# Patient Record
Sex: FEMALE
[De-identification: names, ages, dates, MRNs, and addresses within clinical notes are randomized per-mention and may not be internally consistent; named-entity substitution may affect disease eponyms.]

---

## 2023-12-28 PROBLEM — Z00.00 ENCOUNTER FOR PREVENTIVE HEALTH EXAMINATION: Status: ACTIVE | Noted: 2023-12-28

## 2024-02-02 ENCOUNTER — APPOINTMENT (OUTPATIENT)
Dept: UROGYNECOLOGY | Facility: CLINIC | Age: 19
End: 2024-02-02
Payer: COMMERCIAL

## 2024-02-02 VITALS
WEIGHT: 107 LBS | HEART RATE: 80 BPM | HEIGHT: 61 IN | BODY MASS INDEX: 20.2 KG/M2 | DIASTOLIC BLOOD PRESSURE: 59 MMHG | SYSTOLIC BLOOD PRESSURE: 92 MMHG

## 2024-02-02 DIAGNOSIS — Z87.19 PERSONAL HISTORY OF OTHER DISEASES OF THE DIGESTIVE SYSTEM: ICD-10-CM

## 2024-02-02 DIAGNOSIS — Z78.9 OTHER SPECIFIED HEALTH STATUS: ICD-10-CM

## 2024-02-02 DIAGNOSIS — M62.89 OTHER SPECIFIED DISORDERS OF MUSCLE: ICD-10-CM

## 2024-02-02 DIAGNOSIS — G90.A POSTURAL ORTHOSTATIC TACHYCARDIA SYNDROME [POTS]: ICD-10-CM

## 2024-02-02 NOTE — REASON FOR VISIT
[TextEntry] : Reason for visit: New  Voids per day: 8-10  Voids per night: 1 Urge incontinence: No  Stress incontinence: No Constipation: Yes, using laxatives every 3-4 days Fecal incontinence: No Vaginal bulge: Occasionally

## 2024-02-02 NOTE — COUNSELING
[FreeTextEntry1] :  Please call the office if you decide you would like to try an oral muscle relaxant (like flexeril or tizanidine) to see if it helps with your symptoms  Please call the office if you decide you would like to have botox or nerve injections with Dr Hay in March 2024  Follow up as needed

## 2024-02-02 NOTE — HISTORY OF PRESENT ILLNESS
[FreeTextEntry1] : Pt with pelvic floor dysfunction here for urogynecologic evaluation. 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  Had a laparoscopy around a month ago to rule out endometriosis. Biopsies did not show endo but that there was blood vessels on the pelvic wall that the physician cauterized. She said that her pelvic pressure and pain and bloating all improved after. Was offered to start on hormones to see if helps with her pain and the patient refused.  Starting having pelvic pain around 3 years. Reports constant burning at the opening of the vagina, unable to touch with toilet paper, a finger, a penis. Tried valium suppositories for a week (50mg?) and it made it worse. Tried pelvic PT at 3 locations and the last one said that her symptoms are getting worse and she can't help her. The last time she went to PT was a year ago and she said that it was unbearable  Not currently sexually active. Was active a year ago and she said it was extremely painful at the opening and deeper. Reports trying tampons years ago and it was very painful. Tried inserting her finger in the vagina and she said she was unable to  Has chronic constipation- workup at multiple hospital systems including anal myometry. Currently takes high dosage of x lax every 3-4 days to have a bowel movement. Unable to have a bowel movement without it. Sometimes gets relieve of pelvic pressure/dullness and sometimes it does not improve. They were told that the pelvic hypertonicity could be the issue s/p botox injections into the rectum, no improvement of symptoms  Pelvic organ prolapse: s/p laparoscopy, intermittent bulge, denies splinting Stress urinary incontinence: denies Overactive bladder syndrome: +frequency secondary to pain that improves with urination, no incontinence, no glaucoma Voiding dysfunction: yes Incomplete bladder emptying, yes hesitancy  Lower urinary tract/vaginal symptoms: no recurrent UTIs per year, no hematuria, as above dysuria, no bladder pain  Fecal incontinence: denies, s/p botox to the rectum Defecatory dysfunction: only with laxaxtives Sexual dysfunction: not active Pelvic pain: as above Vaginal dryness denies but reports vaginal discharge that was negative with testing  Her pelvic floor symptoms are significantly bothersome and negatively impacting her quality of life.

## 2024-02-02 NOTE — DISCUSSION/SUMMARY
[FreeTextEntry1] :  History of high tone pelvic floor dysfunction- Discussed the pathophysiology of the above condition. Reviewed management options including medications (oral or vaginal suppository), injections, pelvic floor physical therapy, or referral for possible pudendal nerve blocks. The patient absolutely refuses to try an oral medication and will consider waiting for Dr Hay in March 2024 for the pelvic floor trigger point injections and pudendal nerve blocks or will go for the injections to the pelvic pain specialist in [facility].

## 2024-02-02 NOTE — PHYSICAL EXAM
See lab annotations. Per patient was able to view comments from PCP on portal. Patient verbalizes understanding and has no further questions.. Patient set up follow up appointment in April   [Chaperone Present] : A chaperone was present in the examining room during all aspects of the physical examination [No Acute Distress] : in no acute distress [Well developed] : well developed [Well Nourished] : ~L well nourished [FreeTextEntry1] : Patient declined exam

## 2024-02-23 ENCOUNTER — APPOINTMENT (OUTPATIENT)
Dept: UROLOGY | Facility: CLINIC | Age: 19
End: 2024-02-23

## 2024-11-26 ENCOUNTER — APPOINTMENT (OUTPATIENT)
Dept: OPHTHALMOLOGY | Facility: CLINIC | Age: 19
End: 2024-11-26
Payer: COMMERCIAL

## 2024-11-26 ENCOUNTER — NON-APPOINTMENT (OUTPATIENT)
Age: 19
End: 2024-11-26

## 2024-11-26 PROCEDURE — 92060 SENSORIMOTOR EXAMINATION: CPT

## 2024-11-26 PROCEDURE — 92083 EXTENDED VISUAL FIELD XM: CPT

## 2024-11-26 PROCEDURE — 92250 FUNDUS PHOTOGRAPHY W/I&R: CPT

## 2024-11-26 PROCEDURE — 99204 OFFICE O/P NEW MOD 45 MIN: CPT
